# Patient Record
Sex: MALE | Race: WHITE | Employment: FULL TIME | URBAN - METROPOLITAN AREA
[De-identification: names, ages, dates, MRNs, and addresses within clinical notes are randomized per-mention and may not be internally consistent; named-entity substitution may affect disease eponyms.]

---

## 2018-06-27 ENCOUNTER — APPOINTMENT (OUTPATIENT)
Dept: GENERAL RADIOLOGY | Age: 28
End: 2018-06-27
Attending: NURSE PRACTITIONER
Payer: SELF-PAY

## 2018-06-27 ENCOUNTER — HOSPITAL ENCOUNTER (EMERGENCY)
Age: 28
Discharge: HOME | End: 2018-06-27
Attending: EMERGENCY MEDICINE
Payer: SELF-PAY

## 2018-06-27 VITALS
DIASTOLIC BLOOD PRESSURE: 76 MMHG | TEMPERATURE: 98.5 F | HEIGHT: 67 IN | RESPIRATION RATE: 16 BRPM | WEIGHT: 165 LBS | OXYGEN SATURATION: 99 % | BODY MASS INDEX: 25.9 KG/M2 | HEART RATE: 86 BPM | SYSTOLIC BLOOD PRESSURE: 135 MMHG

## 2018-06-27 DIAGNOSIS — S62.307A CLOSED DISPLACED FRACTURE OF FIFTH METACARPAL BONE OF LEFT HAND, UNSPECIFIED PORTION OF METACARPAL, INITIAL ENCOUNTER: Primary | ICD-10-CM

## 2018-06-27 PROCEDURE — 99283 EMERGENCY DEPT VISIT LOW MDM: CPT

## 2018-06-27 PROCEDURE — 73110 X-RAY EXAM OF WRIST: CPT

## 2018-06-27 PROCEDURE — 74011250637 HC RX REV CODE- 250/637: Performed by: NURSE PRACTITIONER

## 2018-06-27 PROCEDURE — 75810000053 HC SPLINT APPLICATION

## 2018-06-27 RX ORDER — TRAMADOL HYDROCHLORIDE 50 MG/1
50 TABLET ORAL
Qty: 10 TAB | Refills: 0 | Status: SHIPPED | OUTPATIENT
Start: 2018-06-27

## 2018-06-27 RX ORDER — IBUPROFEN 600 MG/1
600 TABLET ORAL
Status: COMPLETED | OUTPATIENT
Start: 2018-06-27 | End: 2018-06-27

## 2018-06-27 RX ORDER — IBUPROFEN 600 MG/1
600 TABLET ORAL
Qty: 20 TAB | Refills: 0 | Status: SHIPPED | OUTPATIENT
Start: 2018-06-27 | End: 2019-01-24

## 2018-06-27 RX ADMIN — IBUPROFEN 600 MG: 600 TABLET ORAL at 16:51

## 2018-06-27 NOTE — DISCHARGE INSTRUCTIONS
Broken Hand: Care Instructions  Your Care Instructions  A hand can break (fracture) during sports, a fall, or other accidents. The break may happen when your hand twists, is hit, or is used to protect you in a fall. Fractures can range from a small, hairline crack, to a bone or bones broken into two or more pieces. Your treatment depends on how bad the break is. Your doctor may have put your hand in a brace, splint, or cast to allow it to heal or to keep it stable until you see another doctor. It may take weeks or months for your hand to heal. You can help it heal with some care at home. You heal best when you take good care of yourself. Eat a variety of healthy foods, and don't smoke. Follow-up care is a key part of your treatment and safety. Be sure to make and go to all appointments, and call your doctor if you are having problems. It's also a good idea to know your test results and keep a list of the medicines you take. How can you care for yourself at home? · Put ice or a cold pack on your hand for 10 to 20 minutes at a time. Try to do this every 1 to 2 hours for the next 3 days (when you are awake). Put a thin cloth between the ice and your cast or splint. Keep your cast or splint dry. · Follow the cast care instructions your doctor gives you. If you have a splint, do not take it off unless your doctor tells you to. · Be safe with medicines. Take pain medicines exactly as directed. ¨ If the doctor gave you a prescription medicine for pain, take it as prescribed. ¨ If you are not taking a prescription pain medicine, ask your doctor if you can take an over-the-counter medicine. · Prop up your hand on pillows when you sit or lie down in the first few days after the injury. Keep your hand higher than the level of your heart. This will help reduce swelling. · Follow instructions for exercises to keep your arm strong.   · Wiggle your uninjured fingers often to reduce swelling and stiffness, but do not use that hand to grasp or carry anything. When should you call for help? Call your doctor now or seek immediate medical care if:  ? · You have new or worse pain. ? · Your hand or fingers are cool or pale or change color. ? · Your cast or splint feels too tight. ? · You have tingling, weakness, or numbness in your hand or fingers. ? Watch closely for changes in your health, and be sure to contact your doctor if:  ? · You do not get better as expected. ? · You have problems with your cast or splint. Where can you learn more? Go to http://willy-peri.info/. Enter [phone] in the search box to learn more about \"Broken Hand: Care Instructions. \"  Current as of: March 21, 2017  Content Version: 11.4  © 9792-5703 [employer]. Care instructions adapted under license by [employer] (which disclaims liability or warranty for this information). If you have questions about a medical condition or this instruction, always ask your healthcare professional. [place] [address] any warranty or liability for your use of this information.

## 2018-06-27 NOTE — LETTER
[address] [place] [address] [address] [facility] [place] EMERGENCY DEPT 
[address] [facility] [id number] [phone] Work/School Note Date: 6/27/2018 To Whom It May concern: 
 
Surinder Chanel. was seen and treated today in the emergency room by the following provider(s): 
Attending Provider: Jimmy Isaac MD 
Nurse Practitioner: Christina Grazon NP. Surinder Chanel. may return to work on 0702/2018, or when cleared by orthopedist. 
 
Sincerely, Christina Garzon NP

## 2018-06-27 NOTE — ED PROVIDER NOTES
HPI Comments: 3:42 PM   28 y.o. male presents to ED C/O left wrist injury. Patient reports he was moving a couch yesterday. Patient was wearing flip flops when he was carrying it, tripped and the couch landed on his left wrist, reports only mild pain initially and reports increased worsening pain since with associated swelling. patient is right hand dominant. patient reports decreased ROM but denies loss of sensation. Patient smokes 1ppd   Patient denies any other symptoms or complaints. The history is provided by the patient. History limited by: No language barrier         No past medical history on file. No past surgical history on file. No family history on file. Social History     Social History    Marital status: SINGLE     Spouse name: N/A    Number of children: N/A    Years of education: N/A     Occupational History    Not on file. Social History Main Topics    Smoking status: Not on file    Smokeless tobacco: Not on file    Alcohol use Not on file    Drug use: Not on file    Sexual activity: Not on file     Other Topics Concern    Not on file     Social History Narrative         ALLERGIES: Review of patient's allergies indicates no known allergies. Review of Systems   Constitutional: Negative for activity change, appetite change, chills, fatigue and fever. HENT: Negative for congestion, ear pain, rhinorrhea and sore throat. Eyes: Negative for pain, discharge, redness and itching. Respiratory: Negative for cough, chest tightness, shortness of breath and wheezing. Cardiovascular: Negative for chest pain and palpitations. Gastrointestinal: Negative for abdominal pain, blood in stool, constipation, diarrhea, nausea and vomiting. Endocrine: Negative for polyuria. Genitourinary: Negative for discharge, dysuria, flank pain, hematuria, penile pain and testicular pain. Musculoskeletal: Positive for arthralgias, joint swelling and myalgias.  Negative for back pain and neck pain. Left wrist and hand pain   Skin: Negative for rash and wound. Allergic/Immunologic: Negative for immunocompromised state. Neurological: Negative for dizziness, weakness, light-headedness, numbness and headaches. Hematological: Negative for adenopathy. Psychiatric/Behavioral: Negative for agitation and confusion. The patient is not nervous/anxious. All other systems reviewed and are negative. Vitals:    06/27/18 1542   BP: 135/76   Pulse: 86   Resp: 16   Temp: 98.5 °F (36.9 °C)   SpO2: 99%   Weight: 74.8 kg (165 lb)   Height: 5' 7\" (1.702 m)            Physical Exam   Constitutional: He is oriented to person, place, and time. He appears well-developed and well-nourished. No distress. HENT:   Head: Atraumatic. Cardiovascular: Normal rate, regular rhythm, normal heart sounds and intact distal pulses. Pulmonary/Chest: Effort normal and breath sounds normal. No respiratory distress. He has no wheezes. He has no rales. Musculoskeletal:        Left elbow: He exhibits normal range of motion, no swelling and no effusion. Left wrist: He exhibits decreased range of motion, tenderness, bony tenderness and swelling. He exhibits no effusion, no crepitus, no deformity and no laceration. Left hand: He exhibits decreased range of motion, tenderness, bony tenderness and swelling. He exhibits normal capillary refill and no deformity. Normal sensation noted. Hands:  Unable to fully make fist of left hand, decreased ROM of 3-5 fingers, no loss of sensation. Slight decrease in flexion and extension of left wrist.    Neurological: He is alert and oriented to person, place, and time. He exhibits normal muscle tone. Coordination normal.   Skin: Skin is warm and dry. He is not diaphoretic. Nursing note and vitals reviewed.        MDM  Number of Diagnoses or Management Options  Closed displaced fracture of fifth metacarpal bone of left hand, unspecified portion of metacarpal, initial encounter:   Diagnosis management comments: MDM  Plan - xray of left wrist  Xray of left wrist - FINDINGS:    The study demonstrates comminuted fractures involving base of the left fifth  metacarpal bone, without obvious dislocation. There is evidence of  intra-articular extension of fractures at the base of the fifth metacarpal bone. No additional fracture in visualized metacarpal bones, the carpal bones or  distal left radius and ulna. 4:45 PM - Patient informed of results, and educated about need for splint  4:52 PM SPLINT ASSESSMENT:  Ulnar gutter Splint was correctly applied to the left forearm by [facility] EDT. Splint is in a good position. Pulse, motor and sensation were intact before and after splint was applied. 4:55 PM Patient understands he must follow-up with orthopedist for further evaluation and treatment. Patient discharged with motrin and tramadol, educated about splint care. Patient educated to return to the ED for any new or worsening symptoms. Patient denies questions. Amount and/or Complexity of Data Reviewed  Tests in the radiology section of CPT®: ordered and reviewed          ED Course       Procedures             RESULTS:    XR WRIST LT AP/LAT/OBL MIN 3V   Final Result          Labs Reviewed - No data to display    No results found for this or any previous visit (from the past 12 hour(s)). PROGRESS NOTE:   3:42 PM   Initial assessment completed. Written by Katharina GUERRERO    DISCHARGE NOTE:    Lorenza Wagoner Jr.'s  results have been reviewed with him. He has been counseled regarding his diagnosis, treatment, and plan. He verbally conveys understanding and agreement of the signs, symptoms, diagnosis, treatment and prognosis and additionally agrees to follow up as discussed. He also agrees with the care-plan and conveys that all of his questions have been answered.   I have also provided discharge instructions for him that include: educational information regarding their diagnosis and treatment, and list of reasons why they would want to return to the ED prior to their follow-up appointment, should his condition change. CLINICAL IMPRESSION:    1. Closed displaced fracture of fifth metacarpal bone of left hand, unspecified portion of metacarpal, initial encounter        AFTER VISIT PLAN:    Discharge Medication List as of 6/27/2018  4:54 PM      START taking these medications    Details   ibuprofen (MOTRIN) 600 mg tablet Take 1 Tab by mouth every six (6) hours as needed for Pain., Print, Disp-20 Tab, R-0      traMADol (ULTRAM) 50 mg tablet Take 1 Tab by mouth every eight (8) hours as needed for Pain.  Max Daily Amount: 150 mg., Print, Disp-10 Tab, R-0              Follow-up Information     Follow up With Details Comments [address] [place] [address] [facility] Schedule an appointment as soon as possible for a visit in 3 days Further evaluation [facility], [address] [facility] Call Help with follow-up [address] [place] [address] Hein Schedule an appointment as soon as possible for a visit in 1 week As needed [facility]  [phone]           Written by Toby LEALC

## 2018-06-28 ENCOUNTER — OFFICE VISIT (OUTPATIENT)
Dept: ORTHOPEDIC SURGERY | Facility: CLINIC | Age: 28
End: 2018-06-28

## 2018-06-28 VITALS
WEIGHT: 165 LBS | HEIGHT: 67 IN | BODY MASS INDEX: 25.9 KG/M2 | SYSTOLIC BLOOD PRESSURE: 113 MMHG | TEMPERATURE: 97.6 F | DIASTOLIC BLOOD PRESSURE: 77 MMHG | OXYGEN SATURATION: 98 % | HEART RATE: 98 BPM

## 2018-06-28 DIAGNOSIS — S62.317A CLOSED DISPLACED FRACTURE OF BASE OF FIFTH METACARPAL BONE OF LEFT HAND, INITIAL ENCOUNTER: Primary | ICD-10-CM

## 2018-06-28 RX ORDER — HYDROCODONE BITARTRATE AND ACETAMINOPHEN 7.5; 325 MG/1; MG/1
1-2 TABLET ORAL
Qty: 60 TAB | Refills: 0 | Status: SHIPPED | OUTPATIENT
Start: 2018-06-28

## 2018-06-28 NOTE — MR AVS SNAPSHOT
Nora Zambrano 
 
 
 [address] [place] [address], [address] [facility] [address] 
[phone] Patient: Evangelist Everett MRN: [id number] AAKASH:[birth year] Visit Information Date & Time Provider Department Dept. Phone Encounter #  
 6/28/2018  2:00 PM Jaime Watson MD [place] [facility] [phone] Follow-up Instructions Return if symptoms worsen or fail to improve. Upcoming Health Maintenance Date Due DTaP/Tdap/Td series (1 - Tdap) 9/3/2010 Influenza Age 5 to Adult 8/1/2018 Allergies as of 6/28/2018  Review Complete On: 6/28/2018 By: Jaime Watson MD  
 No Known Allergies Current Immunizations  Never Reviewed No immunizations on file. Not reviewed this visit You Were Diagnosed With   
  
 Codes Comments Closed displaced fracture of base of fifth metacarpal bone of left hand, initial encounter    -  Primary ICD-10-CM: E38.938D ICD-9-CM: 815.02 Vitals BP Pulse Temp Height(growth percentile) Weight(growth percentile) SpO2  
 113/77 (BP 1 Location: Left arm, BP Patient Position: Sitting) 98 97.6 °F (36.4 °C) (Oral) 5' 7\" (1.702 m) 165 lb (74.8 kg) 98% BMI Smoking Status 25.84 kg/m2 Current Every Day Smoker Vitals History BMI and BSA Data Body Mass Index Body Surface Area  
 25.84 kg/m 2 1.88 m 2 Your Updated Medication List  
  
   
This list is accurate as of 6/28/18  2:53 PM.  Always use your most recent med list.  
  
  
  
  
 HYDROcodone-acetaminophen 7.5-325 mg per tablet Commonly known as:  Bud Quirk Take 1-2 Tabs by mouth nightly as needed for Pain. Max Daily Amount: 2 Tabs. ibuprofen 600 mg tablet Commonly known as:  MOTRIN Take 1 Tab by mouth every six (6) hours as needed for Pain.  
  
 traMADol 50 mg tablet Commonly known as:  ULTRAM  
Take 1 Tab by mouth every eight (8) hours as needed for Pain. Max Daily Amount: 150 mg. Prescriptions Printed Refills HYDROcodone-acetaminophen (NORCO) 7.5-325 mg per tablet 0 Sig: Take 1-2 Tabs by mouth nightly as needed for Pain. Max Daily Amount: 2 Tabs. Class: Print Route: Oral  
  
We Performed the Following CAST SUP SHT ARM ADULT FBRGL [id number] [facility] [place]] CAST SUP SHT ARM SPLINT FBRG [id number] [facility] [place]] CLOSED [facility] 77 [id number] CPT(R)] Follow-up Instructions Return if symptoms worsen or fail to improve. Patient Instructions Broken Hand: Care Instructions Your Care Instructions A hand can break (fracture) during sports, a fall, or other accidents. The break may happen when your hand twists, is hit, or is used to protect you in a fall. Fractures can range from a small, hairline crack, to a bone or bones broken into two or more pieces. Your treatment depends on how bad the break is. Your doctor may have put your hand in a brace, splint, or cast to allow it to heal or to keep it stable until you see another doctor. It may take weeks or months for your hand to heal. You can help it heal with some care at home. You heal best when you take good care of yourself. Eat a variety of healthy foods, and don't smoke. Follow-up care is a key part of your treatment and safety. Be sure to make and go to all appointments, and call your doctor if you are having problems. It's also a good idea to know your test results and keep a list of the medicines you take. How can you care for yourself at home? · Put ice or a cold pack on your hand for 10 to 20 minutes at a time. Try to do this every 1 to 2 hours for the next 3 days (when you are awake). Put a thin cloth between the ice and your cast or splint. Keep your cast or splint dry. · Follow the cast care instructions your doctor gives you. If you have a splint, do not take it off unless your doctor tells you to. · Be safe with medicines. Take pain medicines exactly as directed. ¨ If the doctor gave you a prescription medicine for pain, take it as prescribed. ¨ If you are not taking a prescription pain medicine, ask your doctor if you can take an over-the-counter medicine. · Prop up your hand on pillows when you sit or lie down in the first few days after the injury. Keep your hand higher than the level of your heart. This will help reduce swelling. · Follow instructions for exercises to keep your arm strong. · Wiggle your uninjured fingers often to reduce swelling and stiffness, but do not use that hand to grasp or carry anything. When should you call for help? Call your doctor now or seek immediate medical care if: 
? · You have new or worse pain. ? · Your hand or fingers are cool or pale or change color. ? · Your cast or splint feels too tight. ? · You have tingling, weakness, or numbness in your hand or fingers. ? Watch closely for changes in your health, and be sure to contact your doctor if: 
? · You do not get better as expected. ? · You have problems with your cast or splint. Where can you learn more? Go to http://willy-peri.info/. Enter [phone] [phone] in the search box to learn more about \"Broken Hand: Care Instructions. \" Current as of: March 21, 2017 Content Version: 11.4 © 2176-6168 [employer]. Care instructions adapted under license by [employer] (which disclaims liability or warranty for this information). If you have questions about a medical condition or this instruction, always ask your healthcare professional. [place] [address] any warranty or liability for your use of this information. Introducing [facility]! [employer] introduces [employer] patient portal. Now you can access parts of your medical record, email your doctor's office, and request medication refills online. 1. In your internet browser, go to [email or web address] 2. Click on the First Time User? Click Here link in the Sign In box. You will see the New Member Sign Up page. 3. Enter your Setred Access Code exactly as it appears below. You will not need to use this code after youve completed the sign-up process. If you do not sign up before the expiration date, you must request a new code. · Setred Access Code: [email or web address] Expires: 9/25/2018  3:42 PM 
 
4. Enter the last four digits of your Social Security Number (xxxx) and Date of Birth (mm/dd/yyyy) as indicated and click Submit. You will be taken to the next sign-up page. 5. Create a Setred ID. This will be your Setred login ID and cannot be changed, so think of one that is secure and easy to remember. 6. Create a Setred password. You can change your password at any time. 7. Enter your Password Reset Question and Answer. This can be used at a later time if you forget your password. 8. Enter your e-mail address. You will receive e-mail notification when new information is available in [address] 9. Click Sign Up. You can now view and download portions of your medical record. 10. Click the Download Summary menu link to download a portable copy of your medical information. If you have questions, please visit the Frequently Asked Questions section of the Setred website. Remember, Setred is NOT to be used for urgent needs. For medical emergencies, dial 911. Now available from your iPhone and Android! Please provide this summary of care documentation to your next provider. Your primary care clinician is listed as NONE. If you have any questions after today's visit, please call [phone].

## 2018-06-28 NOTE — LETTER
NOTIFICATION RETURN TO WORK  
 
6/28/2018 2:52 PM 
 
Mr. Rachel Mcwilliams [id number] To Whom It May Concern: 
 
Belle Sweet is currently under the care of [address] [place] [address]. He will return to work: 6-30-18. He is to remain out of work until then. If there are questions or concerns please have the patient contact our office.  
 
 
 
Sincerely, 
 
 
 
Guido Diaz MD

## 2018-06-28 NOTE — PROGRESS NOTES
Patient: Arben Bains                MRN: [id number]       SSN: [id number]  YOB: 1990        AGE: 28 y.o. SEX: male    PCP: None  06/28/18    Chief Complaint   Patient presents with    Hand Pain     Left     HISTORY:  Arben Bains is a 28 y.o. male who sustained a crush injury to his left hand on 6/27/2018. He crushed his hand under a sleeper sofa couch while attempting to move it backward wearing flip-flops. He reports he crushed his hand under the leg of the couch as it fell. He reports some immediate pain but noted increased excruciating pain later that evening. He was seen at [facility] ED where X-rays revealed a comminuted fracture at the base of the fifth metacarpal.  He has been experiencing ulnar left hand pain and swelling. He is right handed. 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 Current weight is 165 pounds. He is 5'7\" tall. No results found for: HBA1C, HGBE8, GKX4ACZQ, NVQ6JZFG, ANB9JRZH  Weight Metrics 6/28/2018 6/27/2018   Weight 165 lb 165 lb   BMI 25.84 kg/m2 25.84 kg/m2       There is no problem list on file for this patient. REVIEW OF SYSTEMS: All Below are Negative except: See HPI   Constitutional: negative for fever, chills, and weight loss. Cardiovascular: negative for chest pain, claudication, leg swelling, SOB, JUAREZ   Gastrointestinal: Negative for pain, N/V/C/D, Blood in stool or urine, dysuria,  hematuria, incontinence, pelvic pain. Musculoskeletal: See HPI   Neurological: Negative for dizziness and weakness. Negative for headaches, Visual changes, confusion, seizures   Phychiatric/Behavioral: Negative for depression, memory loss, substance  abuse.     Extremities: Negative for hair changes, rash, or skin lesion changes. Hematologic: Negative for bleeding problems, bruising, pallor or swollen lymph  nodes   Peripheral Vascular: No calf pain, no circulation deficits. Social History     Social History    Marital status: UNKNOWN     Spouse name: N/A    Number of children: N/A    Years of education: N/A     Occupational History    Not on file. Social History Main Topics    Smoking status: Current Every Day Smoker    Smokeless tobacco: Never Used    Alcohol use Not on file    Drug use: Not on file    Sexual activity: Not on file     Other Topics Concern    Not on file     Social History Narrative    No narrative on file      No Known Allergies   Current Outpatient Prescriptions   Medication Sig    ibuprofen (MOTRIN) 600 mg tablet Take 1 Tab by mouth every six (6) hours as needed for Pain.  traMADol (ULTRAM) 50 mg tablet Take 1 Tab by mouth every eight (8) hours as needed for Pain. Max Daily Amount: 150 mg. No current facility-administered medications for this visit.        PHYSICAL EXAMINATION:  Visit Vitals    /77 (BP 1 Location: Left arm, BP Patient Position: Sitting)    Pulse 98    Temp 97.6 °F (36.4 °C) (Oral)    Ht 5' 7\" (1.702 m)    Wt 165 lb (74.8 kg)    SpO2 98%    BMI 25.84 kg/m2      ORTHO EXAMINATION:  Examination Right Hand Left Hand   Skin Intact Intact   Deformity - -   Swelling - + base of fifth metatarsal   Tenderness - + base of fifth metatarsal   Finger flexion Full Full   Finger extension Full Full   Sensation Normal Normal   Capillary refill Normal Normal   Heberden's nodes - -   Dupuytren's - -     Examination Right Wrist Left Wrist   Skin Intact Intact   Tenderness - + base of fifth metatarsal   Flexion 40 15   Extension 30 20   Deformity - -   Effusion - -   Finger flexion Full Limited little finger to 1 cm of palm   Finger extension Full Full   Tinel's sign - -   Phalen's test - -   Finklestein maneuver - -   Pain with thumb abduction - -   Capillary refill - -     RADIOGRAPHS:  XR RT HAND 6/27/2018 [facility]  IMPRESSION:  Comminuted fractures at the base of left fifth metacarpal bone. I independently reviewed these images today. IMPRESSION:      ICD-10-CM ICD-9-CM    1. Closed displaced fracture of base of fifth metacarpal bone of left hand, initial encounter S62.317A 815.02 HYDROcodone-acetaminophen (NORCO) 7.5-325 mg per tablet      CAST SUP SHT ARM ADULT FBRGL      CAST SUP SHT ARM SPLINT FBRG      CLOSED [place] METACARPAL FX,MANIP     PLAN:  Little and ring fingers ciaran taped together. Ulnar gutter splint applied. Care and treatment discussed. He will follow up in 2 weeks with Maggy-ray. There is no need for surgery at this time.     Scribed by Alek Alfonso [address]) as dictated by Robert Coreas MD

## 2018-06-28 NOTE — PATIENT INSTRUCTIONS
Broken Hand: Care Instructions  Your Care Instructions  A hand can break (fracture) during sports, a fall, or other accidents. The break may happen when your hand twists, is hit, or is used to protect you in a fall. Fractures can range from a small, hairline crack, to a bone or bones broken into two or more pieces. Your treatment depends on how bad the break is. Your doctor may have put your hand in a brace, splint, or cast to allow it to heal or to keep it stable until you see another doctor. It may take weeks or months for your hand to heal. You can help it heal with some care at home. You heal best when you take good care of yourself. Eat a variety of healthy foods, and don't smoke. Follow-up care is a key part of your treatment and safety. Be sure to make and go to all appointments, and call your doctor if you are having problems. It's also a good idea to know your test results and keep a list of the medicines you take. How can you care for yourself at home? · Put ice or a cold pack on your hand for 10 to 20 minutes at a time. Try to do this every 1 to 2 hours for the next 3 days (when you are awake). Put a thin cloth between the ice and your cast or splint. Keep your cast or splint dry. · Follow the cast care instructions your doctor gives you. If you have a splint, do not take it off unless your doctor tells you to. · Be safe with medicines. Take pain medicines exactly as directed. ¨ If the doctor gave you a prescription medicine for pain, take it as prescribed. ¨ If you are not taking a prescription pain medicine, ask your doctor if you can take an over-the-counter medicine. · Prop up your hand on pillows when you sit or lie down in the first few days after the injury. Keep your hand higher than the level of your heart. This will help reduce swelling. · Follow instructions for exercises to keep your arm strong.   · Wiggle your uninjured fingers often to reduce swelling and stiffness, but do not use that hand to grasp or carry anything. When should you call for help? Call your doctor now or seek immediate medical care if:  ? · You have new or worse pain. ? · Your hand or fingers are cool or pale or change color. ? · Your cast or splint feels too tight. ? · You have tingling, weakness, or numbness in your hand or fingers. ? Watch closely for changes in your health, and be sure to contact your doctor if:  ? · You do not get better as expected. ? · You have problems with your cast or splint. Where can you learn more? Go to http://willy-peri.info/. Enter [phone] in the search box to learn more about \"Broken Hand: Care Instructions. \"  Current as of: March 21, 2017  Content Version: 11.4  © 9751-3164 [employer]. Care instructions adapted under license by [employer] (which disclaims liability or warranty for this information). If you have questions about a medical condition or this instruction, always ask your healthcare professional. [place] [address] any warranty or liability for your use of this information.

## 2018-07-12 ENCOUNTER — OFFICE VISIT (OUTPATIENT)
Dept: ORTHOPEDIC SURGERY | Facility: CLINIC | Age: 28
End: 2018-07-12

## 2018-07-12 VITALS
TEMPERATURE: 97.4 F | RESPIRATION RATE: 16 BRPM | OXYGEN SATURATION: 98 % | DIASTOLIC BLOOD PRESSURE: 70 MMHG | BODY MASS INDEX: 25.83 KG/M2 | HEART RATE: 63 BPM | HEIGHT: 67 IN | SYSTOLIC BLOOD PRESSURE: 113 MMHG | WEIGHT: 164.6 LBS

## 2018-07-12 DIAGNOSIS — S62.317A CLOSED DISPLACED FRACTURE OF BASE OF FIFTH METACARPAL BONE OF LEFT HAND, INITIAL ENCOUNTER: Primary | ICD-10-CM

## 2018-07-12 DIAGNOSIS — S62.317D CLOSED DISPLACED FRACTURE OF BASE OF FIFTH METACARPAL BONE OF LEFT HAND WITH ROUTINE HEALING, SUBSEQUENT ENCOUNTER: ICD-10-CM

## 2018-07-12 NOTE — PROGRESS NOTES
Patient: Reva Staley                MRN: [id number]       SSN: [id number]  YOB: 1990        AGE: 28 y.o. SEX: male    PCP: None  07/12/18 07/12/18: Follow up with X ray Left hand. Pain improved overall, wore ulnar gutter about a week, poorly fitting and uncomfortable. Chief Complaint   Patient presents with    Hand Pain     L HAND/WRIST PAIN, F/U APPT. HISTORY:  Reva Child is a 28 y.o. male who sustained a crush injury to his left hand on 6/27/2018. He crushed his hand under a sleeper sofa couch while attempting to move it backward wearing flip-flops. He reports he crushed his hand under the leg of the couch as it fell. He reports some immediate pain but noted increased excruciating pain later that evening. He was seen at [facility] ED where X-rays revealed a comminuted fracture at the base of the fifth metacarpal.  He has been experiencing ulnar left hand pain and swelling. He is right handed. 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 Current weight is 165 pounds. He is 5'7\" tall. No results found for: HBA1C, HGBE8, QZH4PRWS, FNX3OPQE, DXY6GQKZ  Weight Metrics 7/12/2018 6/28/2018 6/27/2018   Weight 164 lb 9.6 oz 165 lb 165 lb   BMI 25.78 kg/m2 25.84 kg/m2 25.84 kg/m2       There is no problem list on file for this patient. REVIEW OF SYSTEMS: All Below are Negative except: See HPI   Constitutional: negative for fever, chills, and weight loss.    Cardiovascular: negative for chest pain, claudication, leg swelling, SOB, JUAREZ      Musculoskeletal: See HPI   Neurological: Negative for dizziness and weakness. Extremities: Negative for hair changes, rash, or skin lesion changes. Social History     Social History    Marital status: UNKNOWN     Spouse name: N/A    Number of children: N/A    Years of education: N/A     Occupational History    Not on file. Social History Main Topics    Smoking status: Current Every Day Smoker    Smokeless tobacco: Never Used    Alcohol use Not on file    Drug use: Not on file    Sexual activity: Not on file     Other Topics Concern    Not on file     Social History Narrative      No Known Allergies   Current Outpatient Prescriptions   Medication Sig    HYDROcodone-acetaminophen (NORCO) 7.5-325 mg per tablet Take 1-2 Tabs by mouth nightly as needed for Pain. Max Daily Amount: 2 Tabs.  ibuprofen (MOTRIN) 600 mg tablet Take 1 Tab by mouth every six (6) hours as needed for Pain.  traMADol (ULTRAM) 50 mg tablet Take 1 Tab by mouth every eight (8) hours as needed for Pain. Max Daily Amount: 150 mg. No current facility-administered medications for this visit.        PHYSICAL EXAMINATION:  Visit Vitals    /70    Pulse 63    Temp 97.4 °F (36.3 °C) (Oral)    Resp 16    Ht 5' 7\" (1.702 m)    Wt 164 lb 9.6 oz (74.7 kg)    SpO2 98%    BMI 25.78 kg/m2      ORTHO EXAMINATION:  Examination Right Hand Left Hand   Skin Intact Intact   Deformity - -   Swelling - Trace dorsal base of fifth metatarsal   Tenderness - Improved dorsal base of fifth metatarsal   Finger flexion Full Lacking 1cm palm to pulp flexion   Finger extension Full Full   Sensation Normal Normal   Capillary refill Normal Normal   Heberden's nodes - -   Dupuytren's - -                                                                             RADIOGRAPHS:  XR RT HAND 6/27/2018 [facility] ED with repeated imaging on 07/12/18 at [email or web address] reveals subtle improvement in comminuted base 4th metacarpal fracture  IMPRESSION:  Comminuted fractures at the base of left fifth metacarpal bone. IMPRESSION:      ICD-10-CM ICD-9-CM    1. Closed displaced fracture of base of fifth metacarpal bone of left hand, initial encounter S62.317A 815.02    2. Closed displaced fracture of base of fifth metacarpal bone of left hand with routine healing, subsequent encounter S62.317D V54.19 AMB POC XRAY, HAND; 3+ VIEWS     PLAN:  Little and ring fingers ciaran taped together. Fast Form splint placed today. Care and treatment discussed. He will follow up in 2 weeks with Maggy-ray. There is no need for surgery at this time.

## 2018-07-26 ENCOUNTER — OFFICE VISIT (OUTPATIENT)
Dept: ORTHOPEDIC SURGERY | Facility: CLINIC | Age: 28
End: 2018-07-26

## 2018-07-26 VITALS
DIASTOLIC BLOOD PRESSURE: 74 MMHG | OXYGEN SATURATION: 99 % | BODY MASS INDEX: 25.11 KG/M2 | SYSTOLIC BLOOD PRESSURE: 112 MMHG | HEART RATE: 92 BPM | WEIGHT: 160 LBS | HEIGHT: 67 IN | TEMPERATURE: 99.1 F | RESPIRATION RATE: 18 BRPM

## 2018-07-26 DIAGNOSIS — S62.317A CLOSED DISPLACED FRACTURE OF BASE OF FIFTH METACARPAL BONE OF LEFT HAND, INITIAL ENCOUNTER: Primary | ICD-10-CM

## 2018-07-26 NOTE — PROGRESS NOTES
Patient: Chuck Purcell                MRN: [id number]       SSN: [id number] YOB: 1990        AGE: 28 y.o. SEX: male PCP: None 
07/26/18 07/26/18: Follow up with X ray Left hand. Pain improved overall, wearing FFS. ROM full. 
 
07/12/18: Follow up with X ray Left hand. Pain improved overall, wore ulnar gutter about a week, poorly fitting and uncomfortable. Chief Complaint Patient presents with  
 Hand Pain Left HISTORY:  Chuck Purcell is a 28 y.o. male who sustained a crush injury to his left hand on 6/27/2018. He crushed his hand under a sleeper sofa couch while attempting to move it backward wearing flip-flops. He reports he crushed his hand under the leg of the couch as it fell. He reports some immediate pain but noted increased excruciating pain later that evening. He was seen at [facility] ED where X-rays revealed a comminuted fracture at the base of the fifth metacarpal.  He has been experiencing ulnar left hand pain and swelling. He is right handed. Pain Assessment  7/26/2018 Location of Pain Hand Location Modifiers Left Severity of Pain 0 Quality of Pain - Duration of Pain - Frequency of Pain - Aggravating Factors - Aggravating Factors Comment - Limiting Behavior No  
Relieving Factors - Result of Injury - Work-Related Injury - Type of Injury - Type of Injury Comment -  
 
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 He is 5'7\" tall. No results found for: HBA1C, HGBE8, VPE2RKVD, WST7MWGS, KEV4HUGB Weight Metrics 7/26/2018 7/12/2018 6/28/2018 6/27/2018 Weight 160 lb 164 lb 9.6 oz 165 lb 165 lb BMI 25.06 kg/m2 25.78 kg/m2 25.84 kg/m2 25.84 kg/m2 There is no problem list on file for this patient. REVIEW OF SYSTEMS: All Below are Negative except: See HPI Constitutional: negative for fever, chills, and weight loss.  
 Cardiovascular: negative for chest pain, claudication, leg swelling, SOB, JUAREZ Musculoskeletal: See HPI Neurological: Negative for dizziness and weakness. Extremities: Negative for hair changes, rash, or skin lesion changes. Social History Social History  Marital status: UNKNOWN Spouse name: N/A  
 Number of children: N/A  
 Years of education: N/A Occupational History  Not on file. Social History Main Topics  Smoking status: Current Every Day Smoker  Smokeless tobacco: Never Used  Alcohol use No  
 Drug use: No  
 Sexual activity: Not on file Other Topics Concern  Not on file Social History Narrative No Known Allergies Current Outpatient Prescriptions Medication Sig  
 HYDROcodone-acetaminophen (NORCO) 7.5-325 mg per tablet Take 1-2 Tabs by mouth nightly as needed for Pain. Max Daily Amount: 2 Tabs.  ibuprofen (MOTRIN) 600 mg tablet Take 1 Tab by mouth every six (6) hours as needed for Pain.  traMADol (ULTRAM) 50 mg tablet Take 1 Tab by mouth every eight (8) hours as needed for Pain. Max Daily Amount: 150 mg. No current facility-administered medications for this visit. PHYSICAL EXAMINATION: 
Visit Vitals  /74  Pulse 92  Temp 99.1 °F (37.3 °C) (Oral)  Resp 18  Ht 5' 7\" (1.702 m)  Wt 160 lb (72.6 kg)  SpO2 99%  BMI 25.06 kg/m2 ORTHO EXAMINATION: 
Examination Right Hand Left Hand Skin Intact Intact Deformity - - Swelling - Resolved Tenderness - None Finger flexion Full Full Finger extension Full Full Sensation Normal Normal  
Capillary refill Normal Normal  
Heberden's nodes - -  
Dupuytren's - -  
 
    
    
    
    
    
    
    
    
    
    
    
    
    
    
 
RADIOGRAPHS: 
XR RT HAND 6/27/2018 [facility] ED with repeated imaging on 07/12/18 at [facility] reveals subtle improvement in comminuted base 4th metacarpal fracture and when imaging repeated on 7/26/18 revealing significant callous inlay at base 5th Left metacarpus. IMPRESSION: 
Comminuted fractures at the base of left fifth metacarpal bone. IMPRESSION:   
  ICD-10-CM ICD-9-CM 1. Closed displaced fracture of base of fifth metacarpal bone of left hand, initial encounter S62.317A 815.02 AMB POC XRAY, HAND; 3+ VIEWS  
 
PLAN:  DC FFS, full WBAT LUE.  F/U prn

## 2019-01-24 ENCOUNTER — HOSPITAL ENCOUNTER (EMERGENCY)
Age: 29
Discharge: HOME | End: 2019-01-24
Attending: EMERGENCY MEDICINE
Payer: SELF-PAY

## 2019-01-24 VITALS
HEART RATE: 93 BPM | TEMPERATURE: 99 F | RESPIRATION RATE: 20 BRPM | SYSTOLIC BLOOD PRESSURE: 137 MMHG | OXYGEN SATURATION: 98 % | DIASTOLIC BLOOD PRESSURE: 90 MMHG

## 2019-01-24 DIAGNOSIS — K04.7 DENTAL INFECTION: Primary | ICD-10-CM

## 2019-01-24 PROCEDURE — 99282 EMERGENCY DEPT VISIT SF MDM: CPT

## 2019-01-24 RX ORDER — IBUPROFEN 600 MG/1
600 TABLET ORAL
Qty: 20 TAB | Refills: 0 | Status: SHIPPED | OUTPATIENT
Start: 2019-01-24

## 2019-01-24 RX ORDER — PENICILLIN V POTASSIUM 500 MG/1
500 TABLET, FILM COATED ORAL 4 TIMES DAILY
Qty: 28 TAB | Refills: 0 | Status: SHIPPED | OUTPATIENT
Start: 2019-01-24 | End: 2019-01-31

## 2019-01-25 NOTE — ED PROVIDER NOTES
[facility] EMERGENCY DEPT 
 
 
8:41 PM 
 
Date: 1/24/2019 Patient Name: James Allison History of Presenting Illness Chief Complaint Patient presents with  Dental Pain  Jaw Pain History Provided By: Patient Chief Complaint: Dental Pain Duration: 4 Days Timing:  Constant and Worsening Location: Right lower jaw Quality: Stabbing Severity: Moderate Modifying Factors: Pain exacerbated when swallowing, speaking Associated Symptoms: facial swelling 
 
33 y.o. male with no pertinent PMHx or social history who presents to the emergency department c/o constant and worsening dental pain which began 4 days ago. Pt reports mild toothache initially which then worsened to a \"stabbing\" jaw pain in the right lower jaw when he woke up this morning. Pain is exacerbated when he swallows or speaks. Pt also observes facial swelling to right side of mouth. Has been unable to eat due to pain. Pt reports taking Ibuprofen and Aspirin with little relief of pain. Does not have a dentist or dental insurance. No other complaints. Nursing notes regarding the HPI and triage nursing notes were reviewed. Prior medical records were reviewed. Current Outpatient Medications Medication Sig Dispense Refill  penicillin v potassium (VEETID) 500 mg tablet Take 1 Tab by mouth four (4) times daily for 7 days. 28 Tab 0  ibuprofen (MOTRIN) 600 mg tablet Take 1 Tab by mouth every six (6) hours as needed for Pain. 20 Tab 0  
 HYDROcodone-acetaminophen (NORCO) 7.5-325 mg per tablet Take 1-2 Tabs by mouth nightly as needed for Pain. Max Daily Amount: 2 Tabs. 60 Tab 0  
 traMADol (ULTRAM) 50 mg tablet Take 1 Tab by mouth every eight (8) hours as needed for Pain. Max Daily Amount: 150 mg. 10 Tab 0 Past History Past Medical History: No past medical history on file. Past Surgical History: No past surgical history on file. Family History: No family history on file. Social History: Social History Tobacco Use  Smoking status: Current Every Day Smoker  Smokeless tobacco: Never Used Substance Use Topics  Alcohol use: No  
 Drug use: No  
 
 
Allergies: 
No Known Allergies Patient's primary care provider (as noted in EPIC):  None Review of Systems Constitutional: Negative for diaphoresis. HENT: Positive for dental problem and facial swelling. Negative for congestion. Eyes: Negative for discharge. Respiratory: Negative for stridor. Cardiovascular: Negative for palpitations. Gastrointestinal: Negative for diarrhea. Genitourinary: Negative for flank pain. Musculoskeletal: Negative for back pain. Neurological: Negative for weakness. Psychiatric/Behavioral: Negative for hallucinations. All other systems reviewed and are negative. Visit Vitals /90 Pulse 93 Temp 99 °F (37.2 °C) Resp 20 SpO2 98% PHYSICAL EXAM: 
 
CONSTITUTIONAL:  Alert, in no apparent distress;  well developed;  well nourished. HEAD:  Normocephalic, atraumatic. EYES:  EOMI. Non-icteric sclera. Normal conjunctiva. ENTM:  Nose:  no rhinorrhea. Throat:  no erythema or exudate, mucous membranes moist, uvula midline, airway patent. FACE: Mild swelling to right lower jaw. NECK:  Supple RESPIRATORY:  Chest clear, equal breath sounds, good air movement. Without wheezes, rhonchi or rales. CARDIOVASCULAR:  Regular rate and rhythm. No murmurs, rubs, or gallops. NEURO:  Moves all four extremities, and grossly normal motor exam. 
SKIN:  No rashes;  Normal for age. PSYCH:  Alert and normal affect. Oropharynx/ teeth:  Tooth # 30 has decay and has focal tenderness to percussion. Mild surrounding gingival erythema and edema. There is no fluctuance or abscess. Oropharynx is otherwise normal and symmetric.   
 
 
 
IMPRESSION AND MEDICAL DECISION MAKING: 
Based upon the patients presentation with noted HPI and PE, along with the work up done in the emergency department, I believe that the patient is having a toothache with mild surrounding gingival erythema/edema, without obvious abscess. Will write for penicillin and have him f/u with dentist.  
 
Diagnosis: 1. Dental infection Disposition: Discharge Follow-up Information Follow up With Specialties Details Why Contact Reggie Sellers  In 3 days  [address] [address] 
[phone] [facility] EMERGENCY DEPT Emergency Medicine  If symptoms worsen [address] [id number] 
[phone] Medication List  
  
START taking these medications   
penicillin v potassium 500 mg tablet Commonly known as:  VEETID Take 1 Tab by mouth four (4) times daily for 7 days. CONTINUE taking these medications   
ibuprofen 600 mg tablet Commonly known as:  MOTRIN Take 1 Tab by mouth every six (6) hours as needed for Pain. ASK your doctor about these medications HYDROcodone-acetaminophen 7.5-325 mg per tablet Commonly known as:  Irma Laity Take 1-2 Tabs by mouth nightly as needed for Pain. Max Daily Amount: 2 Tabs. traMADol 50 mg tablet Commonly known as:  ULTRAM 
Take 1 Tab by mouth every eight (8) hours as needed for Pain. Max Daily Amount: 150 mg. Where to Get Your Medications Information about where to get these medications is not yet available Ask your nurse or doctor about these medications · ibuprofen 600 mg tablet · penicillin v potassium 500 mg tablet Genie Leyden, PA Scribe Attestation Iona Zimmer acting as a scribe for and in the presence of Faizan Raymond     
January 24, 2019 at 8:54 PM 
    
Provider Attestation:     
I personally performed the services described in the documentation, reviewed the documentation, as recorded by the scribe in my presence, and it accurately and completely records my words and actions.  January 24, 2019 at 8:54 PM - KARTHIKEYAN Raymond   
 Genie Leyden, PA

## 2019-01-25 NOTE — ED TRIAGE NOTES
Pt reports dental pain on right side of mouth that has spread to right side of jaw hurting and difficult to open mouth all the way for the past 2 days. Noted swelling to right side of face.